# Patient Record
Sex: FEMALE | Race: WHITE | NOT HISPANIC OR LATINO | Employment: FULL TIME | URBAN - METROPOLITAN AREA
[De-identification: names, ages, dates, MRNs, and addresses within clinical notes are randomized per-mention and may not be internally consistent; named-entity substitution may affect disease eponyms.]

---

## 2022-02-07 ENCOUNTER — OFFICE VISIT (OUTPATIENT)
Dept: OBSTETRICS AND GYNECOLOGY | Facility: CLINIC | Age: 42
End: 2022-02-07

## 2022-02-07 VITALS
BODY MASS INDEX: 23.35 KG/M2 | DIASTOLIC BLOOD PRESSURE: 70 MMHG | HEIGHT: 63 IN | SYSTOLIC BLOOD PRESSURE: 102 MMHG | WEIGHT: 131.8 LBS

## 2022-02-07 DIAGNOSIS — N93.9 ABNORMAL UTERINE BLEEDING (AUB): ICD-10-CM

## 2022-02-07 DIAGNOSIS — Z01.419 ENCOUNTER FOR ANNUAL ROUTINE GYNECOLOGICAL EXAMINATION: Primary | ICD-10-CM

## 2022-02-07 PROCEDURE — 99386 PREV VISIT NEW AGE 40-64: CPT | Performed by: OBSTETRICS & GYNECOLOGY

## 2022-02-07 RX ORDER — FLUTICASONE PROPIONATE 50 MCG
SPRAY, SUSPENSION (ML) NASAL
COMMUNITY

## 2022-02-07 NOTE — PROGRESS NOTES
"GYN Annual Exam     CC - Here for annual exam.   Abnormal uterine bleeding    Subjective   HPI  Trista Do is a 41 y.o. female, , who presents for annual well woman exam. Patient's last menstrual period was 2022 (exact date). pt states that she is having bleeding continuous sometimes it is heavy. Pt states that it does slow down to spotting but then becomes heavier again after intercourse. Dysmenorrhea:moderate, occurring before the heavy bleeding starts. States that once the heavy bleeding starts \"it is like a relief from the pain\".  Patient reports problems with: abnormal bleeding and states that she has begun to have headaches around her period that are bothersome. Partner Status: Marital Status: .  New Partners since last visit: no.  Desires STD Screening: no.    Additional OB/GYN History   Current contraception: contraceptive methods: Tubal ligation  Desires to: do not start contraception  Last Pap : 10/29/19 In [place]. Pt states that this pap was normal  Last Completed Pap Smear     This patient has no relevant Health Maintenance data.        History of abnormal Pap smear: yes - pt states that she had one abmormal pap way back when she was like 17  Family history of uterine, colon, breast, or ovarian cancer: yes - MGM- breast, mother- uterine  Performs monthly Self-Breast Exam: no  Last mammogram: pt states that she has never had a mammogram.  Last Completed Mammogram     This patient has no relevant Health Maintenance data.         Exercises Regularly: no  Feelings of Anxiety or Depression: no  Tobacco Usage?: No   OB History        4    Para   2    Term   2            AB   2    Living   2       SAB   2    IAB        Ectopic        Molar        Multiple        Live Births   2                Health Maintenance   Topic Date Due   • Annual Gynecologic Pelvic and Breast Exam  Never done   • ANNUAL PHYSICAL  Never done   • COVID-19 Vaccine (1) Never done   • TDAP/TD VACCINES (1 " "- Tdap) Never done   • INFLUENZA VACCINE  Never done   • HEPATITIS C SCREENING  Never done   • PAP SMEAR  Never done   • Pneumococcal Vaccine 0-64  Aged Out       The additional following portions of the patient's history were reviewed and updated as appropriate: allergies, current medications, past family history, past medical history, past social history and past surgical history.    Review of Systems   Constitutional: Negative for activity change, appetite change, chills, diaphoresis, fatigue, fever, unexpected weight gain and unexpected weight loss.   Eyes: Negative for visual disturbance.   Respiratory: Negative for cough and shortness of breath.    Cardiovascular: Negative for chest pain and palpitations.   Gastrointestinal: Negative for abdominal distention, abdominal pain, nausea and vomiting.   Genitourinary: Positive for menstrual problem. Negative for breast discharge, breast lump, breast pain and pelvic pain.   Musculoskeletal: Negative for back pain.   Neurological: Negative for light-headedness and headache.   Psychiatric/Behavioral: Negative for agitation, behavioral problems, decreased concentration and depressed mood.       I have reviewed and agree with the HPI, ROS, and historical information as entered above. Andi Parkinson MD    Objective   /70   Ht 160 cm (63\")   Wt 59.8 kg (131 lb 12.8 oz)   LMP 01/21/2022 (Exact Date)   Breastfeeding No   BMI 23.35 kg/m²     Physical Exam  Vitals reviewed. Exam conducted with a chaperone present.   Constitutional:       Appearance: Normal appearance. She is normal weight.   HENT:      Head: Normocephalic and atraumatic.   Cardiovascular:      Rate and Rhythm: Normal rate and regular rhythm.   Pulmonary:      Effort: Pulmonary effort is normal.      Breath sounds: Normal breath sounds.   Chest:   Breasts:      Right: Normal.      Left: Normal.       Abdominal:      General: Abdomen is flat. Bowel sounds are normal.      Palpations: Abdomen is " soft.   Genitourinary:     General: Normal vulva.      Vagina: Normal.      Cervix: Normal.      Uterus: Normal. Tender.       Adnexa: Right adnexa normal and left adnexa normal.   Musculoskeletal:      Cervical back: Neck supple.   Skin:     General: Skin is warm and dry.   Neurological:      Mental Status: She is alert and oriented to person, place, and time.   Psychiatric:         Mood and Affect: Mood normal.         Behavior: Behavior normal.         Assessment/Plan     Assessment     Problem List Items Addressed This Visit        Genitourinary and Reproductive     Abnormal uterine bleeding (AUB)    Relevant Orders    US Non-ob Transvaginal      Other Visit Diagnoses     Encounter for annual routine gynecological examination    -  Primary    Relevant Orders    Pap IG, HPV-hr    MRI Breast Bilateral Screening With & Without Contrast    CBC (No Diff)    Hemoglobin A1c    Lipid Panel    TSH    Vitamin D 25 Hydroxy    Comprehensive Metabolic Panel    Follicle Stimulating Hormone          1. GYN annual well woman exam.   2. Abnormal uterine bleeding    Plan     1. Annual gynecologic exam. Pap smear and breast exam performed. Age appropriate labs today and will schedule breast MRI for screening. Preventative care information discussed.   2. F/u in 2 weeks for US and to discuss findings.       Andi Parkinson MD  02/07/2022

## 2022-02-08 LAB
25(OH)D3+25(OH)D2 SERPL-MCNC: 24.2 NG/ML (ref 30–100)
ALBUMIN SERPL-MCNC: 4.3 G/DL (ref 3.8–4.8)
ALBUMIN/GLOB SERPL: 1.7 {RATIO} (ref 1.2–2.2)
ALP SERPL-CCNC: 54 IU/L (ref 44–121)
ALT SERPL-CCNC: 7 IU/L (ref 0–32)
AST SERPL-CCNC: 14 IU/L (ref 0–40)
BILIRUB SERPL-MCNC: 1.2 MG/DL (ref 0–1.2)
BUN SERPL-MCNC: 7 MG/DL (ref 6–24)
BUN/CREAT SERPL: 9 (ref 9–23)
CALCIUM SERPL-MCNC: 9.4 MG/DL (ref 8.7–10.2)
CHLORIDE SERPL-SCNC: 104 MMOL/L (ref 96–106)
CHOLEST SERPL-MCNC: 211 MG/DL (ref 100–199)
CO2 SERPL-SCNC: 24 MMOL/L (ref 20–29)
CREAT SERPL-MCNC: 0.78 MG/DL (ref 0.57–1)
ERYTHROCYTE [DISTWIDTH] IN BLOOD BY AUTOMATED COUNT: 12.2 % (ref 11.7–15.4)
FSH SERPL-ACNC: 71 MIU/ML
GLOBULIN SER CALC-MCNC: 2.6 G/DL (ref 1.5–4.5)
GLUCOSE SERPL-MCNC: 95 MG/DL (ref 65–99)
HBA1C MFR BLD: 5.4 % (ref 4.8–5.6)
HCT VFR BLD AUTO: 40.6 % (ref 34–46.6)
HDLC SERPL-MCNC: 54 MG/DL
HGB BLD-MCNC: 13.9 G/DL (ref 11.1–15.9)
LDLC SERPL CALC-MCNC: 133 MG/DL (ref 0–99)
MCH RBC QN AUTO: 30.7 PG (ref 26.6–33)
MCHC RBC AUTO-ENTMCNC: 34.2 G/DL (ref 31.5–35.7)
MCV RBC AUTO: 90 FL (ref 79–97)
PLATELET # BLD AUTO: 268 X10E3/UL (ref 150–450)
POTASSIUM SERPL-SCNC: 4 MMOL/L (ref 3.5–5.2)
PROT SERPL-MCNC: 6.9 G/DL (ref 6–8.5)
RBC # BLD AUTO: 4.53 X10E6/UL (ref 3.77–5.28)
SODIUM SERPL-SCNC: 141 MMOL/L (ref 134–144)
TRIGL SERPL-MCNC: 133 MG/DL (ref 0–149)
TSH SERPL DL<=0.005 MIU/L-ACNC: 3.03 UIU/ML (ref 0.45–4.5)
VLDLC SERPL CALC-MCNC: 24 MG/DL (ref 5–40)
WBC # BLD AUTO: 4.3 X10E3/UL (ref 3.4–10.8)

## 2022-02-08 RX ORDER — ERGOCALCIFEROL 1.25 MG/1
50000 CAPSULE ORAL WEEKLY
Qty: 4 CAPSULE | Refills: 2 | Status: SHIPPED | OUTPATIENT
Start: 2022-02-08 | End: 2022-05-09

## 2022-02-10 DIAGNOSIS — Z01.419 ENCOUNTER FOR ANNUAL ROUTINE GYNECOLOGICAL EXAMINATION: ICD-10-CM

## 2022-02-11 ENCOUNTER — PATIENT ROUNDING (BHMG ONLY) (OUTPATIENT)
Dept: OBSTETRICS AND GYNECOLOGY | Facility: CLINIC | Age: 42
End: 2022-02-11

## 2022-02-11 NOTE — PROGRESS NOTES
February 11, 2022    Hello, may I speak with Trista Do?    My name is Nena       [facility] am  with [facility]  [address]  [facility] [address] [phone].    Before we get started may I verify your date of birth? 1980    I am calling to officially welcome you to our practice and ask about your recent visit. Is this a good time to talk? No - left voicemail     Tell me about your visit with us. What things went well?  NA        We're always looking for ways to make our patients' experiences even better. Do you have recommendations on ways we may improve?  NA    Overall were you satisfied with your first visit to our practice? NA        I appreciate you taking the time to speak with me today. Is there anything else I can do for you? NA      Thank you, and have a great day.

## 2022-02-21 ENCOUNTER — OFFICE VISIT (OUTPATIENT)
Dept: OBSTETRICS AND GYNECOLOGY | Facility: CLINIC | Age: 42
End: 2022-02-21

## 2022-02-21 VITALS
SYSTOLIC BLOOD PRESSURE: 104 MMHG | BODY MASS INDEX: 22.82 KG/M2 | HEIGHT: 63 IN | WEIGHT: 128.8 LBS | DIASTOLIC BLOOD PRESSURE: 76 MMHG

## 2022-02-21 DIAGNOSIS — Z01.419 ENCOUNTER FOR ANNUAL ROUTINE GYNECOLOGICAL EXAMINATION: ICD-10-CM

## 2022-02-21 DIAGNOSIS — N93.9 ABNORMAL UTERINE BLEEDING (AUB): Primary | ICD-10-CM

## 2022-02-21 PROCEDURE — 99213 OFFICE O/P EST LOW 20 MIN: CPT | Performed by: OBSTETRICS & GYNECOLOGY

## 2022-02-21 NOTE — PATIENT INSTRUCTIONS
"Dev Textbook of Endocrinology (14th ed., pp. 574-641). [place], [place]: Else[employer].\">   Perimenopause  Perimenopause is the normal time of a woman's life when the levels of estrogen, the female hormone produced by the ovaries, begin to decrease. This leads to changes in menstrual periods before they stop completely (menopause). Perimenopause can begin 2-8 years before menopause. During perimenopause, the ovaries may or may not produce an egg and a woman can still become pregnant.  What are the causes?  This condition is caused by a natural change in hormone levels that happens as you get older.  What increases the risk?  This condition is more likely to start at an earlier age if you have certain medical conditions or have undergone treatments, including:  · A tumor of the pituitary gland in the brain.  · A disease that affects the ovaries and hormone production.  · Certain cancer treatments, such as chemotherapy or hormone therapy, or radiation therapy on the pelvis.  · Heavy smoking and excessive alcohol use.  · Family history of early menopause.  What are the signs or symptoms?  Perimenopausal changes affect each woman differently. Symptoms of this condition may include:  · Hot flashes.  · Irregular menstrual periods.  · Night sweats.  · Changes in feelings about sex. This could be a decrease in sex drive or an increased discomfort around your sexuality.  · Vaginal dryness.  · Headaches.  · Mood swings.  · Depression.  · Problems sleeping (insomnia).  · Memory problems or trouble concentrating.  · Irritability.  · Tiredness.  · Weight gain.  · Anxiety.  · Trouble getting pregnant.  How is this diagnosed?  This condition is diagnosed based on your medical history, a physical exam, your age, your menstrual history, and your symptoms. Hormone tests may also be done.  How is this treated?  In some cases, no treatment is needed. You and your health care provider should make a decision together about whether " treatment is necessary. Treatment will be based on your individual condition and preferences. Various treatments are available, such as:  · Menopausal hormone therapy (MHT).  · Medicines to treat specific symptoms.  · Acupuncture.  · Vitamin or herbal supplements.  Before starting treatment, make sure to let your health care provider know if you have a personal or family history of:  · Heart disease.  · Breast cancer.  · Blood clots.  · Diabetes.  · Osteoporosis.  Follow these instructions at home:  Medicines  · Take over-the-counter and prescription medicines only as told by your health care provider.  · Take vitamin supplements only as told by your health care provider.  · Talk with your health care provider before starting any herbal supplements.  Lifestyle    · Do not use any products that contain nicotine or tobacco, such as cigarettes, e-cigarettes, and chewing tobacco. If you need help quitting, ask your health care provider.  · Get at least 30 minutes of physical activity on 5 or more days each week.  · Eat a balanced diet that includes fresh fruits and vegetables, whole grains, soybeans, eggs, lean meat, and low-fat dairy.  · Avoid alcoholic and caffeinated beverages, as well as spicy foods. This may help prevent hot flashes.  · Get 7-8 hours of sleep each night.  · Dress in layers that can be removed to help you manage hot flashes.  · Find ways to manage stress, such as deep breathing, meditation, or journaling.    General instructions    · Keep track of your menstrual periods, including:  ? When they occur.  ? How heavy they are and how long they last.  ? How much time passes between periods.  · Keep track of your symptoms, noting when they start, how often you have them, and how long they last.  · Use vaginal lubricants or moisturizers to help with vaginal dryness and improve comfort during sex.  · You can still become pregnant if you are having irregular periods. Make sure you use contraception during  perimenopause if you do not want to get pregnant.  · Keep all follow-up visits. This is important. This includes any group therapy or counseling.    Contact a health care provider if:  · You have heavy vaginal bleeding or pass blood clots.  · Your period lasts more than 2 days longer than normal.  · Your periods are recurring sooner than 21 days.  · You bleed after having sex.  · You have pain during sex.  Get help right away if you have:  · Chest pain, trouble breathing, or trouble talking.  · Severe depression.  · Pain when you urinate.  · Severe headaches.  · Vision problems.  Summary  · Perimenopause is the time when a woman's body begins to move into menopause. This may happen naturally or as a result of other health problems or medical treatments.  · Perimenopause can begin 2-8 years before menopause, and it can last for several years.  · Perimenopausal symptoms can be managed through medicines, lifestyle changes, and complementary therapies such as acupuncture.  This information is not intended to replace advice given to you by your health care provider. Make sure you discuss any questions you have with your health care provider.  Document Revised: 06/03/2021 Document Reviewed: 06/03/2021  Elsevier Patient Education © 2021 [employer]

## 2022-02-21 NOTE — PROGRESS NOTES
"Chief Complaint   Patient presents with   • Follow-up   • Gynecologic Exam   Menorrhagia    Subjective   HPI  Trista Do is a 41 y.o. female, , who presents for follow up abnormal uterine bleeding.      Patient reports monthly periods, but sometimes has periods lasting 3 days to 14 days. Bleeding can be anywhere from spotting to heavy with clots. On her heaviest day, she reports changing pad/tampon every hour. Patient reports pain in her legs and stomach during her cycle, she describes this as \"shooting\" pain. Patient reports that she has starting having headaches with her periods. She states she has experienced this problem for 3 years. She states that the problem is worsening.  The patient reports additional symptoms as hot flashes and painful intercourse . Patient reports that more frequently after intercourse, she starts to bleed.      Discussed perimenopausal symptoms and reviewed labs today. Discussed and all questions answered.     US performed today with normal uterus and endometrial lining. Discussed findings along with treatment options.     Her last LMP was Patient's last menstrual period was 2022 (exact date).  Periods are regular every 28-30 days, lasting 3-14 days days.  Dysmenorrhea:moderate, occurring premenstrually.  Patient reports problems with: none.  Partner Status: Marital Status: .  New Partners since last visit: no.  Desires STD Screening: no.    Additional OB/GYN History   Current contraception: contraceptive methods: Tubal ligation  Desires to: do not start contraception  Last Pap :   Last Completed Pap Smear          PAP SMEAR (Every 3 Years) Next due on 2022  Pap IG, HPV-hr              History of abnormal Pap smear: yes - Age 16, patient unable to recall results.   Last mammogram: Never. - Patient has MRI of breast scheduled.    Last Completed Mammogram     This patient has no relevant Health Maintenance data.        Tobacco Usage?: No   OB History  " "      4    Para   2    Term   2            AB   2    Living   2       SAB   2    IAB        Ectopic        Molar        Multiple        Live Births   2                Health Maintenance   Topic Date Due   • ANNUAL PHYSICAL  Never done   • COVID-19 Vaccine (1) Never done   • TDAP/TD VACCINES (1 - Tdap) Never done   • INFLUENZA VACCINE  Never done   • HEPATITIS C SCREENING  Never done   • Annual Gynecologic Pelvic and Breast Exam  2023   • PAP SMEAR  2025   • Pneumococcal Vaccine 0-64  Aged Out       The additional following portions of the patient's history were reviewed and updated as appropriate: allergies and current medications.    Review of Systems   Constitutional: Negative.    Respiratory: Negative.    Cardiovascular: Negative.    Gastrointestinal: Negative.    Genitourinary: Positive for menstrual problem. Negative for pelvic pain.   Musculoskeletal: Negative.    Neurological: Negative.    Hematological: Negative.    Psychiatric/Behavioral: Negative.        I have reviewed and agree with the HPI, ROS, and historical information as entered above. Andi Parkinson MD    Objective   /76 (BP Location: Left arm, Patient Position: Sitting, Cuff Size: Adult)   Ht 160 cm (63\")   Wt 58.4 kg (128 lb 12.8 oz)   LMP 2022 (Exact Date)   BMI 22.82 kg/m²     Physical Exam  Vitals reviewed.   Constitutional:       Appearance: Normal appearance. She is normal weight.   Skin:     General: Skin is warm and dry.   Neurological:      Mental Status: She is alert and oriented to person, place, and time.   Psychiatric:         Mood and Affect: Mood normal.         Behavior: Behavior normal.         Assessment/Plan     Assessment     Problem List Items Addressed This Visit        Genitourinary and Reproductive     Abnormal uterine bleeding (AUB) - Primary      Other Visit Diagnoses     Encounter for annual routine gynecological examination        Relevant Orders    Mammo Screening " Digital Tomosynthesis Bilateral With CAD          1. Menorrhagia    Plan     Return in about 2 weeks (around 3/7/2022) for for Mirena IUD insertion. Continue NSAIDs as needed for cramping and bleeding until IUD placement.   1. Will order Mammogram      Andi Parkinson MD  02/21/2022

## 2022-04-11 ENCOUNTER — APPOINTMENT (OUTPATIENT)
Dept: MAMMOGRAPHY | Facility: HOSPITAL | Age: 42
End: 2022-04-11

## 2022-08-25 ENCOUNTER — TELEPHONE (OUTPATIENT)
Dept: OBSTETRICS AND GYNECOLOGY | Facility: CLINIC | Age: 42
End: 2022-08-25